# Patient Record
Sex: MALE | Race: WHITE | NOT HISPANIC OR LATINO | URBAN - METROPOLITAN AREA
[De-identification: names, ages, dates, MRNs, and addresses within clinical notes are randomized per-mention and may not be internally consistent; named-entity substitution may affect disease eponyms.]

---

## 2024-04-29 ENCOUNTER — APPOINTMENT (OUTPATIENT)
Dept: PRIMARY CARE | Facility: CLINIC | Age: 29
End: 2024-04-29
Payer: COMMERCIAL

## 2024-09-23 ENCOUNTER — APPOINTMENT (OUTPATIENT)
Dept: PRIMARY CARE | Facility: CLINIC | Age: 29
End: 2024-09-23
Payer: COMMERCIAL

## 2024-09-23 VITALS
TEMPERATURE: 98.2 F | DIASTOLIC BLOOD PRESSURE: 80 MMHG | BODY MASS INDEX: 26.19 KG/M2 | OXYGEN SATURATION: 96 % | WEIGHT: 193.4 LBS | SYSTOLIC BLOOD PRESSURE: 120 MMHG | HEIGHT: 72 IN | HEART RATE: 72 BPM

## 2024-09-23 DIAGNOSIS — E55.9 INADEQUATE INTAKE OF CALCIUM AND VITAMIN D: ICD-10-CM

## 2024-09-23 DIAGNOSIS — Z02.1 PRE-EMPLOYMENT EXAMINATION: Primary | ICD-10-CM

## 2024-09-23 DIAGNOSIS — R42 DIZZINESS: ICD-10-CM

## 2024-09-23 DIAGNOSIS — Z13.220 SCREENING FOR LIPID DISORDERS: ICD-10-CM

## 2024-09-23 DIAGNOSIS — Z13.29 SCREENING FOR THYROID DISORDER: ICD-10-CM

## 2024-09-23 DIAGNOSIS — Z13.89 SCREENING FOR BLOOD OR PROTEIN IN URINE: ICD-10-CM

## 2024-09-23 DIAGNOSIS — E58 INADEQUATE INTAKE OF CALCIUM AND VITAMIN D: ICD-10-CM

## 2024-09-23 PROCEDURE — 99395 PREV VISIT EST AGE 18-39: CPT | Performed by: INTERNAL MEDICINE

## 2024-09-23 PROCEDURE — 3008F BODY MASS INDEX DOCD: CPT | Performed by: INTERNAL MEDICINE

## 2024-09-23 RX ORDER — MULTIVITAMIN
1 TABLET ORAL DAILY
COMMUNITY

## 2024-09-23 ASSESSMENT — PATIENT HEALTH QUESTIONNAIRE - PHQ9
SUM OF ALL RESPONSES TO PHQ9 QUESTIONS 1 AND 2: 0
1. LITTLE INTEREST OR PLEASURE IN DOING THINGS: NOT AT ALL
2. FEELING DOWN, DEPRESSED OR HOPELESS: NOT AT ALL

## 2024-09-26 ASSESSMENT — ENCOUNTER SYMPTOMS
ABDOMINAL PAIN: 0
DYSURIA: 0
COUGH: 0
LIGHT-HEADEDNESS: 0
ACTIVITY CHANGE: 0
SORE THROAT: 0
MYALGIAS: 0

## 2024-09-26 NOTE — PROGRESS NOTES
CHIEF COMPLAINT:   Annual Wellness Checkup       HISTORY OF PRESENT ILLNESS:   Brandon Litten comes for annual medical check up.  Otherwise doing well. No acute medical complaint today. Chronic medical conditions are stable with current medical management. Cognitive function is assessed. Immunizations are age appropriate. Home medications have been reconciled. The healthy lifestyle has been reinforced. Encouraged continued avoidance of tobacco, alcohol, poly pharmacy and substances. Functional capacity has been assessed. Discussed about fall risk and safety measures, at home and outside.  Age appropriate cancer screening tests were recommended. Reminded about code status and health care Power of  (POA). Discussed about mental health and wellbeing after reviewing depression screening questionnaire  (PHQ 9) with the patient for 5 mins. Vital signs, recent lab results, imaging studies were reviewed. Patient wanted to discuss about brief transient dizziness.  So a complete physical exams was performed.       Review of Systems   Constitutional:  Negative for activity change.   HENT:  Negative for congestion and sore throat.    Respiratory:  Negative for cough.    Cardiovascular:  Negative for chest pain.   Gastrointestinal:  Negative for abdominal pain.   Endocrine: Negative for polyuria.   Genitourinary:  Negative for dysuria.   Musculoskeletal:  Negative for myalgias.   Skin:  Negative for rash.   Neurological:  Negative for light-headedness.   Psychiatric/Behavioral:  Negative for behavioral problems.        Visit Vitals  /80   Pulse 72   Temp 36.8 °C (98.2 °F)   Ht 1.829 m (6')   Wt 87.7 kg (193 lb 6.4 oz)   SpO2 96%   BMI 26.23 kg/m²   Smoking Status Every Day   BSA 2.11 m²           Wt Readings from Last 10 Encounters:   09/23/24 87.7 kg (193 lb 6.4 oz)   09/21/23 86.7 kg (191 lb 3.2 oz)   09/22/22 85.7 kg (189 lb)   02/17/22 84.8 kg (187 lb)        Physical Exam  Vitals and nursing note reviewed.    Constitutional:       Appearance: Normal appearance.   HENT:      Head: Normocephalic.      Right Ear: Tympanic membrane normal.      Left Ear: Tympanic membrane normal.      Nose: Nose normal.      Mouth/Throat:      Mouth: Mucous membranes are moist.   Cardiovascular:      Rate and Rhythm: Normal rate and regular rhythm.      Pulses: Normal pulses.      Heart sounds: No murmur heard.  Pulmonary:      Effort: No respiratory distress.      Breath sounds: Normal breath sounds.   Abdominal:      Palpations: Abdomen is soft.   Musculoskeletal:      Cervical back: Neck supple.      Right lower leg: No edema.      Left lower leg: No edema.   Skin:     General: Skin is warm.      Findings: No rash.   Neurological:      General: No focal deficit present.      Mental Status: He is alert and oriented to person, place, and time.   Psychiatric:         Mood and Affect: Mood normal.          RECENT LABS:  Lab Results   Component Value Date    WBC 6.5 10/06/2022    HGB 15.3 10/06/2022    HCT 45.4 10/06/2022     10/06/2022    CHOL 168 10/06/2022    TRIG 217 (H) 10/06/2022    HDL 30.0 (A) 10/06/2022    ALT 35 10/06/2022    AST 29 10/06/2022     10/06/2022    K 4.4 10/06/2022     10/06/2022    CREATININE 0.86 10/06/2022    BUN 15 10/06/2022    CO2 29 10/06/2022    TSH 1.16 10/06/2022     Lab Results   Component Value Date    GLUCOSE 89 10/06/2022    CALCIUM 9.7 10/06/2022     10/06/2022    K 4.4 10/06/2022    CO2 29 10/06/2022     10/06/2022    BUN 15 10/06/2022    CREATININE 0.86 10/06/2022        MEDICATIONS:   Current Outpatient Medications   Medication Instructions    multivitamin tablet 1 tablet, oral, Daily        TODAY'S VISIT  DX:     1. Pre-employment examination  Overall health is stable and at base line. Will continue with current medical therapy and plan.  Immunizations, cancer screening tests are age appropriately up to date.      2. Screening for lipid disorders  Lipid Panel      3.  Screening for thyroid disorder  Triiodothyronine, Free    TSH with reflex to Free T4 if abnormal      4. Inadequate intake of calcium and vitamin D  Vitamin D 25-Hydroxy,Total (for eval of Vitamin D levels)      5. Screening for blood or protein in urine  Urinalysis with Reflex Microscopic      6. Dizziness  CBC and Auto Differential    Comprehensive Metabolic Panel         MEDICAL DECISION MAKING:   - Relevant correspondence/notes from specialty consultants were reviewed.  - Active co morbidities conditions are stable for now.    - Cognitive function stable.    - Immunizations are age appropriately up to date.   - Preventative cancer screening tests are up-to-date.    - F/U in 2025      PS: This note was completed using Dragon voice recognition technology and may include unintended errors with respect to translation of words, typographical errors or grammar errors which may not have been identified while finalizing the chart.

## 2024-10-03 ENCOUNTER — LAB (OUTPATIENT)
Dept: LAB | Facility: LAB | Age: 29
End: 2024-10-03
Payer: COMMERCIAL

## 2024-10-03 DIAGNOSIS — R42 DIZZINESS: ICD-10-CM

## 2024-10-03 DIAGNOSIS — Z13.89 SCREENING FOR BLOOD OR PROTEIN IN URINE: ICD-10-CM

## 2024-10-03 DIAGNOSIS — Z13.220 SCREENING FOR LIPID DISORDERS: ICD-10-CM

## 2024-10-03 DIAGNOSIS — E58 INADEQUATE INTAKE OF CALCIUM AND VITAMIN D: ICD-10-CM

## 2024-10-03 DIAGNOSIS — E55.9 INADEQUATE INTAKE OF CALCIUM AND VITAMIN D: ICD-10-CM

## 2024-10-03 DIAGNOSIS — E55.9 VITAMIN D INSUFFICIENCY: Primary | ICD-10-CM

## 2024-10-03 DIAGNOSIS — Z13.29 SCREENING FOR THYROID DISORDER: ICD-10-CM

## 2024-10-03 LAB
25(OH)D3 SERPL-MCNC: 20 NG/ML (ref 30–100)
ALBUMIN SERPL BCP-MCNC: 4.8 G/DL (ref 3.4–5)
ALP SERPL-CCNC: 58 U/L (ref 33–120)
ALT SERPL W P-5'-P-CCNC: 21 U/L (ref 10–52)
ANION GAP SERPL CALC-SCNC: 11 MMOL/L (ref 10–20)
APPEARANCE UR: ABNORMAL
AST SERPL W P-5'-P-CCNC: 18 U/L (ref 9–39)
BASOPHILS # BLD AUTO: 0.05 X10*3/UL (ref 0–0.1)
BASOPHILS NFR BLD AUTO: 0.9 %
BILIRUB SERPL-MCNC: 0.5 MG/DL (ref 0–1.2)
BILIRUB UR STRIP.AUTO-MCNC: NEGATIVE MG/DL
BUN SERPL-MCNC: 16 MG/DL (ref 6–23)
CALCIUM SERPL-MCNC: 9.5 MG/DL (ref 8.6–10.3)
CHLORIDE SERPL-SCNC: 106 MMOL/L (ref 98–107)
CHOLEST SERPL-MCNC: 165 MG/DL (ref 0–199)
CHOLESTEROL/HDL RATIO: 6.1
CO2 SERPL-SCNC: 28 MMOL/L (ref 21–32)
COLOR UR: YELLOW
CREAT SERPL-MCNC: 0.95 MG/DL (ref 0.5–1.3)
EGFRCR SERPLBLD CKD-EPI 2021: >90 ML/MIN/1.73M*2
EOSINOPHIL # BLD AUTO: 0.08 X10*3/UL (ref 0–0.7)
EOSINOPHIL NFR BLD AUTO: 1.4 %
ERYTHROCYTE [DISTWIDTH] IN BLOOD BY AUTOMATED COUNT: 12.5 % (ref 11.5–14.5)
GLUCOSE SERPL-MCNC: 95 MG/DL (ref 74–99)
GLUCOSE UR STRIP.AUTO-MCNC: NORMAL MG/DL
HCT VFR BLD AUTO: 45.6 % (ref 41–52)
HDLC SERPL-MCNC: 27.1 MG/DL
HGB BLD-MCNC: 15.4 G/DL (ref 13.5–17.5)
HYALINE CASTS #/AREA URNS AUTO: ABNORMAL /LPF
IMM GRANULOCYTES # BLD AUTO: 0.01 X10*3/UL (ref 0–0.7)
IMM GRANULOCYTES NFR BLD AUTO: 0.2 % (ref 0–0.9)
KETONES UR STRIP.AUTO-MCNC: NEGATIVE MG/DL
LDLC SERPL CALC-MCNC: 102 MG/DL
LEUKOCYTE ESTERASE UR QL STRIP.AUTO: NEGATIVE
LYMPHOCYTES # BLD AUTO: 2.05 X10*3/UL (ref 1.2–4.8)
LYMPHOCYTES NFR BLD AUTO: 35 %
MCH RBC QN AUTO: 31.4 PG (ref 26–34)
MCHC RBC AUTO-ENTMCNC: 33.8 G/DL (ref 32–36)
MCV RBC AUTO: 93 FL (ref 80–100)
MONOCYTES # BLD AUTO: 0.33 X10*3/UL (ref 0.1–1)
MONOCYTES NFR BLD AUTO: 5.6 %
MUCOUS THREADS #/AREA URNS AUTO: ABNORMAL /LPF
NEUTROPHILS # BLD AUTO: 3.34 X10*3/UL (ref 1.2–7.7)
NEUTROPHILS NFR BLD AUTO: 56.9 %
NITRITE UR QL STRIP.AUTO: NEGATIVE
NON HDL CHOLESTEROL: 138 MG/DL (ref 0–149)
NRBC BLD-RTO: 0 /100 WBCS (ref 0–0)
PH UR STRIP.AUTO: 7.5 [PH]
PLATELET # BLD AUTO: 246 X10*3/UL (ref 150–450)
POTASSIUM SERPL-SCNC: 4.7 MMOL/L (ref 3.5–5.3)
PROT SERPL-MCNC: 6.9 G/DL (ref 6.4–8.2)
PROT UR STRIP.AUTO-MCNC: ABNORMAL MG/DL
RBC # BLD AUTO: 4.91 X10*6/UL (ref 4.5–5.9)
RBC # UR STRIP.AUTO: NEGATIVE /UL
RBC #/AREA URNS AUTO: ABNORMAL /HPF
SODIUM SERPL-SCNC: 140 MMOL/L (ref 136–145)
SP GR UR STRIP.AUTO: 1.03
TRIGL SERPL-MCNC: 178 MG/DL (ref 0–149)
TSH SERPL-ACNC: 1.75 MIU/L (ref 0.44–3.98)
UROBILINOGEN UR STRIP.AUTO-MCNC: NORMAL MG/DL
VLDL: 36 MG/DL (ref 0–40)
WBC # BLD AUTO: 5.9 X10*3/UL (ref 4.4–11.3)
WBC #/AREA URNS AUTO: ABNORMAL /HPF

## 2024-10-03 PROCEDURE — 84481 FREE ASSAY (FT-3): CPT

## 2024-10-03 PROCEDURE — 84443 ASSAY THYROID STIM HORMONE: CPT

## 2024-10-03 PROCEDURE — 80053 COMPREHEN METABOLIC PANEL: CPT

## 2024-10-03 PROCEDURE — 80061 LIPID PANEL: CPT

## 2024-10-03 PROCEDURE — 36415 COLL VENOUS BLD VENIPUNCTURE: CPT

## 2024-10-03 PROCEDURE — 81001 URINALYSIS AUTO W/SCOPE: CPT

## 2024-10-03 PROCEDURE — 85025 COMPLETE CBC W/AUTO DIFF WBC: CPT

## 2024-10-03 PROCEDURE — 82306 VITAMIN D 25 HYDROXY: CPT

## 2024-10-03 RX ORDER — ERGOCALCIFEROL 1.25 MG/1
50000 CAPSULE ORAL
Qty: 12 CAPSULE | Refills: 1 | Status: SHIPPED | OUTPATIENT
Start: 2024-10-06 | End: 2025-10-06

## 2024-10-04 LAB — T3FREE SERPL-MCNC: 3.8 PG/ML (ref 2.3–4.2)

## 2024-10-16 ENCOUNTER — APPOINTMENT (OUTPATIENT)
Dept: PRIMARY CARE | Facility: CLINIC | Age: 29
End: 2024-10-16
Payer: COMMERCIAL

## 2024-10-16 DIAGNOSIS — F41.9 ANXIETY AND DEPRESSION: Primary | ICD-10-CM

## 2024-10-16 DIAGNOSIS — F32.A ANXIETY AND DEPRESSION: Primary | ICD-10-CM

## 2024-10-16 PROCEDURE — 99443 PR PHYS/QHP TELEPHONE EVALUATION 21-30 MIN: CPT | Performed by: INTERNAL MEDICINE

## 2024-10-16 RX ORDER — VENLAFAXINE HYDROCHLORIDE 37.5 MG/1
37.5 CAPSULE, EXTENDED RELEASE ORAL DAILY
Qty: 30 CAPSULE | Refills: 0 | Status: SHIPPED | OUTPATIENT
Start: 2024-10-16 | End: 2025-10-16

## 2024-10-16 ASSESSMENT — PATIENT HEALTH QUESTIONNAIRE - PHQ9
5. POOR APPETITE OR OVEREATING: NOT AT ALL
6. FEELING BAD ABOUT YOURSELF - OR THAT YOU ARE A FAILURE OR HAVE LET YOURSELF OR YOUR FAMILY DOWN: MORE THAN HALF THE DAYS
8. MOVING OR SPEAKING SO SLOWLY THAT OTHER PEOPLE COULD HAVE NOTICED. OR THE OPPOSITE, BEING SO FIGETY OR RESTLESS THAT YOU HAVE BEEN MOVING AROUND A LOT MORE THAN USUAL: NOT AT ALL
SUM OF ALL RESPONSES TO PHQ QUESTIONS 1-9: 11
7. TROUBLE CONCENTRATING ON THINGS, SUCH AS READING THE NEWSPAPER OR WATCHING TELEVISION: SEVERAL DAYS
SUM OF ALL RESPONSES TO PHQ9 QUESTIONS 1 AND 2: 6
10. IF YOU CHECKED OFF ANY PROBLEMS, HOW DIFFICULT HAVE THESE PROBLEMS MADE IT FOR YOU TO DO YOUR WORK, TAKE CARE OF THINGS AT HOME, OR GET ALONG WITH OTHER PEOPLE: SOMEWHAT DIFFICULT
2. FEELING DOWN, DEPRESSED OR HOPELESS: NEARLY EVERY DAY
1. LITTLE INTEREST OR PLEASURE IN DOING THINGS: NEARLY EVERY DAY
3. TROUBLE FALLING OR STAYING ASLEEP OR SLEEPING TOO MUCH: NOT AT ALL
4. FEELING TIRED OR HAVING LITTLE ENERGY: MORE THAN HALF THE DAYS
9. THOUGHTS THAT YOU WOULD BE BETTER OFF DEAD, OR OF HURTING YOURSELF: NOT AT ALL

## 2024-10-16 NOTE — PROGRESS NOTES
Brandon Litten, roberta 28 y.o. male was seen today:   Chief Complaint   Patient presents with    Medication review     Virtual or Telephone Consent    A telephone visit (audio only) between the patient (at the originating site) and the provider (at the distant site) was utilized to provide this telehealth service.   Verbal consent was requested and obtained from Brandon Litten on this date, 10/16/24 for a telehealth visit.         VISIT SUMMERY:  Rocky  was on the phone conference with me today.  Although it was supposed to be a video conference however there is a technical issues.  The phone conversation went on about his mental health.  Patient was seen few months ago and he mentioned about the anxiety.  At that time he wanted to control his anxiety symptoms with meditation and support from the friends and family members.  However recently he is experiencing more anxiety, depression and sometimes he feels very down and sad.  At this time he is interested to get on some antianxiety that has antidepressant properties as well.  He currently does not take any other medications.  He does smoke we talked about smoking cessation.  He is not homicidal or suicidal.  He has judgment and insight.  He also has immediate help in case he goes into crisis.  I will start him on Effexor.      TODAY'S VISIT  DX:     1. Anxiety and depression  venlafaxine XR (Effexor XR) 37.5 mg 24 hr capsule           MEDICAL DECISION MAKING:  - Treatment and therapy plan are as above   - Active medical co morbidities have been considered.   - Recent lab work and relevant imaging studies were reviewed.    - Correspondence/notes from specialty consultants were checked.    - Next Follow up in 2 months    Review of Systems   Constitutional:  Negative for activity change.   HENT:  Negative for congestion and sore throat.    Respiratory:  Negative for cough.    Cardiovascular:  Negative for chest pain.   Gastrointestinal:  Negative for abdominal  pain.   Endocrine: Negative for polyuria.   Genitourinary:  Negative for dysuria.   Musculoskeletal:  Negative for myalgias.   Skin:  Negative for rash.   Neurological:  Negative for light-headedness.   Psychiatric/Behavioral:  Negative for behavioral problems.         Wt Readings from Last 10 Encounters:   09/23/24 87.7 kg (193 lb 6.4 oz)   09/21/23 86.7 kg (191 lb 3.2 oz)   09/22/22 85.7 kg (189 lb)   02/17/22 84.8 kg (187 lb)     Physical Exam  Constitutional:       General: He is not in acute distress.  HENT:      Nose: No rhinorrhea.   Pulmonary:      Effort: Pulmonary effort is normal. No respiratory distress.      Breath sounds: No stridor. No wheezing.   Neurological:      Mental Status: He is alert.   Psychiatric:         Mood and Affect: Mood normal.         Judgment: Judgment normal.        MEDICATIONS:   Current Outpatient Medications   Medication Instructions    ergocalciferol (VITAMIN D-2) 50,000 Units, oral, Once Weekly    multivitamin tablet 1 tablet, Daily    venlafaxine XR (EFFEXOR XR) 37.5 mg, oral, Daily, Do not crush or chew.       RECENT LABS:  Lab Results   Component Value Date    WBC 5.9 10/03/2024    HGB 15.4 10/03/2024    HCT 45.6 10/03/2024     10/03/2024    CHOL 165 10/03/2024    TRIG 178 (H) 10/03/2024    HDL 27.1 10/03/2024    ALT 21 10/03/2024    AST 18 10/03/2024     10/03/2024    K 4.7 10/03/2024     10/03/2024    CREATININE 0.95 10/03/2024    BUN 16 10/03/2024    CO2 28 10/03/2024    TSH 1.75 10/03/2024     Lab Results   Component Value Date    GLUCOSE 95 10/03/2024    CALCIUM 9.5 10/03/2024     10/03/2024    K 4.7 10/03/2024    CO2 28 10/03/2024     10/03/2024    BUN 16 10/03/2024    CREATININE 0.95 10/03/2024      Lab Results   Component Value Date    LDLCALC 102 (H) 10/03/2024    CREATININE 0.95 10/03/2024             P.S: This note was completed using Dragon voice recognition technology and may include unintended errors with respect to translation  of words, typographical errors or grammar errors which may not have been identified while finalizing the chart.           .[employer]lab

## 2024-10-25 PROBLEM — F41.9 ANXIETY AND DEPRESSION: Status: ACTIVE | Noted: 2024-10-25

## 2024-10-25 PROBLEM — F32.A ANXIETY AND DEPRESSION: Status: ACTIVE | Noted: 2024-10-25

## 2024-10-25 ASSESSMENT — ENCOUNTER SYMPTOMS
ACTIVITY CHANGE: 0
LIGHT-HEADEDNESS: 0
DYSURIA: 0
ABDOMINAL PAIN: 0
COUGH: 0
MYALGIAS: 0
SORE THROAT: 0